# Patient Record
Sex: MALE | Race: OTHER | NOT HISPANIC OR LATINO | URBAN - METROPOLITAN AREA
[De-identification: names, ages, dates, MRNs, and addresses within clinical notes are randomized per-mention and may not be internally consistent; named-entity substitution may affect disease eponyms.]

---

## 2022-01-05 VITALS
SYSTOLIC BLOOD PRESSURE: 144 MMHG | DIASTOLIC BLOOD PRESSURE: 80 MMHG | HEART RATE: 91 BPM | TEMPERATURE: 97 F | RESPIRATION RATE: 16 BRPM | OXYGEN SATURATION: 98 %

## 2022-01-05 RX ORDER — CHLORHEXIDINE GLUCONATE 213 G/1000ML
1 SOLUTION TOPICAL ONCE
Refills: 0 | Status: DISCONTINUED | OUTPATIENT
Start: 2022-01-10 | End: 2022-01-25

## 2022-01-05 NOTE — H&P ADULT - HISTORY OF PRESENT ILLNESS
COVID:   Cardiologist: Dr. Kathy Arriaag   Pharmacy: [facility], [phone]  Escort:     67 y/o male w/ PMHx HTN, HLD, and arthritis who presented to outpatient cardiologist, Dr. Arriaga, c/o substernal, non-radiating chest pain gradually worsening in severity, experienced w/ moderate physical exertion and relieved w/ rest. Patient also endorsed dyspnea w/ light physical exertion and bilateral calf/foot heaviness and pain w/ ambulation of 3-5 city blocks or 2-3 flights of stairs. Patient denies any syncope, dizziness, palpitations, abdominal pain, nausea/vomiting, melena, LE edema, fever, chills, cough, or recent sick contacts. NST (11/08/2021) revealed small perfusion abnormality of mild intensity in the anterior and apical regions on stress images and reversible on rest images, post stress EF 56%, normal myocardial thickening, and normal wall motion.     In light of patient's risk factors, CCS Class III anginal symptoms, and abnormal NST results, patient now presents for cardiac catheterization w/ possible intervention if clinically indicated.  COVID: Negative 1/7/22-copy in chart  Cardiologist: Dr. Kathy Arriaga   Pharmacy: [facility], [phone]  Escort:     65 y/o male w/ PMHx HTN, HLD, and arthritis who presented to outpatient cardiologist, Dr. Arriaga, c/o substernal, non-radiating chest pain gradually worsening in severity, experienced w/ moderate physical exertion and relieved w/ rest. Patient also endorsed dyspnea w/ light physical exertion and bilateral calf/foot heaviness and pain w/ ambulation of 3-5 city blocks or 2-3 flights of stairs. Patient denies any syncope, dizziness, palpitations, abdominal pain, nausea/vomiting, melena, LE edema, fever, chills, cough, or recent sick contacts. NST (11/08/2021) revealed small perfusion abnormality of mild intensity in the anterior and apical regions on stress images and reversible on rest images, post stress EF 56%, normal myocardial thickening, and normal wall motion.     In light of patient's risk factors, CCS Class III anginal symptoms, and abnormal NST results, patient now presents for cardiac catheterization w/ possible intervention if clinically indicated.  COVID: Negative 1/6/22-copy in chart  Cardiologist: Dr. Kathy Arriaga   Pharmacy: [facility], [phone]  Escort:     67 y/o male w/ PMHx HTN, HLD, and arthritis who presented to outpatient cardiologist, Dr. Arriaga, c/o substernal, non-radiating chest pain gradually worsening in severity, experienced w/ moderate physical exertion and relieved w/ rest. Patient also endorsed dyspnea w/ light physical exertion and bilateral calf/foot heaviness and pain w/ ambulation of 3-5 city blocks or 2-3 flights of stairs. Patient denies any syncope, dizziness, palpitations, abdominal pain, nausea/vomiting, melena, LE edema, fever, chills, cough, or recent sick contacts. NST (11/08/2021) revealed small perfusion abnormality of mild intensity in the anterior and apical regions on stress images and reversible on rest images, post stress EF 56%, normal myocardial thickening, and normal wall motion.     In light of patient's risk factors, CCS Class III anginal symptoms, and abnormal NST results, patient now presents for cardiac catheterization w/ possible intervention if clinically indicated.  COVID: Negative 1/6/22-copy in chart  Cardiologist: Dr. Kathy Arriaga   Pharmacy: [facility], [phone]  Escort:     65 y/o male w/ PMHx HTN, HLD, and arthritis who presented to outpatient cardiologist, Dr. Arriaga, c/o substernal, non-radiating chest pain gradually worsening in severity, experienced w/ moderate physical exertion and relieved w/ rest. Patient also endorsed dyspnea w/ light physical exertion and bilateral calf/foot heaviness and pain w/ ambulation of 3-5 city blocks or 2-3 flights of stairs. Patient denies any syncope, dizziness, palpitations, abdominal pain, nausea/vomiting, melena, LE edema, fever, chills, cough, or recent sick contacts. NST (11/08/2021) revealed small perfusion abnormality of mild intensity in the anterior and apical regions on stress images and reversible on rest images, post stress EF 56%, normal myocardial thickening, and normal wall motion.     In light of patient's risk factors, CCS Class III anginal symptoms, and abnormal NST results, patient now presents for cardiac catheterization w/ possible intervention if clinically indicated.     Prior to using [facility] patient used [facility] (in system)

## 2022-01-05 NOTE — H&P ADULT - ASSESSMENT
65 y/o male w/ PMHx HTN, HLD, and arthritis who presents for cardiac cath due to patient's risk factors, CCS Angina Class III Symptoms, and abnormal NST.    ASA III               Mallampati III  Risks & benefits of procedure and alternative therapy have been explained to the patient including but not limited to: allergic reaction, bleeding w/possible need for blood transfusion, infection, renal and vascular compromise, limb damage, arrhythmia, stroke, vessel dissection/perforation, Myocardial infarction, emergent CABG. Informed consent obtained and in chart.       Labs from 1/6/2022 accepted- Hgb/HCT 13.5/39.1 (normal). Patient denies bleeding, BRBPR, melena, hematuria, hematemesis.   ASA 81 mg PO every other day-last dose 1/8/22 ASA  mg PO x 1 and Plavix 600 mg PO x 1 given prior to procedure

## 2022-01-10 ENCOUNTER — OUTPATIENT (OUTPATIENT)
Dept: OUTPATIENT SERVICES | Facility: HOSPITAL | Age: 67
LOS: 1 days | Discharge: HOME | End: 2022-01-10
Payer: COMMERCIAL

## 2022-01-10 LAB
A1C WITH ESTIMATED AVERAGE GLUCOSE RESULT: 4.3 % — SIGNIFICANT CHANGE UP (ref 4–5.6)
ALBUMIN SERPL ELPH-MCNC: 4.7 G/DL — SIGNIFICANT CHANGE UP (ref 3.3–5)
ALP SERPL-CCNC: 45 U/L — SIGNIFICANT CHANGE UP (ref 40–120)
ALT FLD-CCNC: 27 U/L — SIGNIFICANT CHANGE UP (ref 10–45)
ANION GAP SERPL CALC-SCNC: 11 MMOL/L — SIGNIFICANT CHANGE UP (ref 5–17)
APTT BLD: 31.6 SEC — SIGNIFICANT CHANGE UP (ref 27.5–35.5)
AST SERPL-CCNC: 20 U/L — SIGNIFICANT CHANGE UP (ref 10–40)
BASOPHILS # BLD AUTO: 0.03 K/UL — SIGNIFICANT CHANGE UP (ref 0–0.2)
BASOPHILS NFR BLD AUTO: 0.5 % — SIGNIFICANT CHANGE UP (ref 0–2)
BILIRUB SERPL-MCNC: 0.7 MG/DL — SIGNIFICANT CHANGE UP (ref 0.2–1.2)
BUN SERPL-MCNC: 19 MG/DL — SIGNIFICANT CHANGE UP (ref 7–23)
CALCIUM SERPL-MCNC: 10 MG/DL — SIGNIFICANT CHANGE UP (ref 8.4–10.5)
CHLORIDE SERPL-SCNC: 105 MMOL/L — SIGNIFICANT CHANGE UP (ref 96–108)
CHOLEST SERPL-MCNC: 159 MG/DL — SIGNIFICANT CHANGE UP
CK MB CFR SERPL CALC: 2.8 NG/ML — SIGNIFICANT CHANGE UP (ref 0–6.7)
CK SERPL-CCNC: 139 U/L — SIGNIFICANT CHANGE UP (ref 30–200)
CO2 SERPL-SCNC: 23 MMOL/L — SIGNIFICANT CHANGE UP (ref 22–31)
CREAT SERPL-MCNC: 1.5 MG/DL — HIGH (ref 0.5–1.3)
EOSINOPHIL # BLD AUTO: 0.09 K/UL — SIGNIFICANT CHANGE UP (ref 0–0.5)
EOSINOPHIL NFR BLD AUTO: 1.6 % — SIGNIFICANT CHANGE UP (ref 0–6)
ESTIMATED AVERAGE GLUCOSE: 77 MG/DL — SIGNIFICANT CHANGE UP (ref 68–114)
GLUCOSE SERPL-MCNC: 96 MG/DL — SIGNIFICANT CHANGE UP (ref 70–99)
HCT VFR BLD CALC: 40.6 % — SIGNIFICANT CHANGE UP (ref 39–50)
HDLC SERPL-MCNC: 49 MG/DL — SIGNIFICANT CHANGE UP
HGB BLD-MCNC: 14.7 G/DL — SIGNIFICANT CHANGE UP (ref 13–17)
IMM GRANULOCYTES NFR BLD AUTO: 0.5 % — SIGNIFICANT CHANGE UP (ref 0–1.5)
INR BLD: 1.04 — SIGNIFICANT CHANGE UP (ref 0.88–1.16)
LIPID PNL WITH DIRECT LDL SERPL: 99 MG/DL — SIGNIFICANT CHANGE UP
LYMPHOCYTES # BLD AUTO: 1.56 K/UL — SIGNIFICANT CHANGE UP (ref 1–3.3)
LYMPHOCYTES # BLD AUTO: 28.2 % — SIGNIFICANT CHANGE UP (ref 13–44)
MCHC RBC-ENTMCNC: 30.4 PG — SIGNIFICANT CHANGE UP (ref 27–34)
MCHC RBC-ENTMCNC: 36.2 GM/DL — HIGH (ref 32–36)
MCV RBC AUTO: 83.9 FL — SIGNIFICANT CHANGE UP (ref 80–100)
MONOCYTES # BLD AUTO: 0.61 K/UL — SIGNIFICANT CHANGE UP (ref 0–0.9)
MONOCYTES NFR BLD AUTO: 11 % — SIGNIFICANT CHANGE UP (ref 2–14)
NEUTROPHILS # BLD AUTO: 3.22 K/UL — SIGNIFICANT CHANGE UP (ref 1.8–7.4)
NEUTROPHILS NFR BLD AUTO: 58.2 % — SIGNIFICANT CHANGE UP (ref 43–77)
NON HDL CHOLESTEROL: 110 MG/DL — SIGNIFICANT CHANGE UP
NRBC # BLD: 0 /100 WBCS — SIGNIFICANT CHANGE UP (ref 0–0)
PLATELET # BLD AUTO: 230 K/UL — SIGNIFICANT CHANGE UP (ref 150–400)
POTASSIUM SERPL-MCNC: 4.3 MMOL/L — SIGNIFICANT CHANGE UP (ref 3.5–5.3)
POTASSIUM SERPL-SCNC: 4.3 MMOL/L — SIGNIFICANT CHANGE UP (ref 3.5–5.3)
PROT SERPL-MCNC: 7.6 G/DL — SIGNIFICANT CHANGE UP (ref 6–8.3)
PROTHROM AB SERPL-ACNC: 12.5 SEC — SIGNIFICANT CHANGE UP (ref 10.6–13.6)
RBC # BLD: 4.84 M/UL — SIGNIFICANT CHANGE UP (ref 4.2–5.8)
RBC # FLD: 16.3 % — HIGH (ref 10.3–14.5)
SODIUM SERPL-SCNC: 139 MMOL/L — SIGNIFICANT CHANGE UP (ref 135–145)
TRIGL SERPL-MCNC: 56 MG/DL — SIGNIFICANT CHANGE UP
WBC # BLD: 5.54 K/UL — SIGNIFICANT CHANGE UP (ref 3.8–10.5)
WBC # FLD AUTO: 5.54 K/UL — SIGNIFICANT CHANGE UP (ref 3.8–10.5)

## 2022-01-10 PROCEDURE — 85025 COMPLETE CBC W/AUTO DIFF WBC: CPT

## 2022-01-10 PROCEDURE — 85730 THROMBOPLASTIN TIME PARTIAL: CPT

## 2022-01-10 PROCEDURE — 82550 ASSAY OF CK (CPK): CPT

## 2022-01-10 PROCEDURE — 36415 COLL VENOUS BLD VENIPUNCTURE: CPT

## 2022-01-10 PROCEDURE — 93010 ELECTROCARDIOGRAM REPORT: CPT

## 2022-01-10 PROCEDURE — 80061 LIPID PANEL: CPT

## 2022-01-10 PROCEDURE — C1769: CPT

## 2022-01-10 PROCEDURE — 85610 PROTHROMBIN TIME: CPT

## 2022-01-10 PROCEDURE — 93458 L HRT ARTERY/VENTRICLE ANGIO: CPT

## 2022-01-10 PROCEDURE — 93458 L HRT ARTERY/VENTRICLE ANGIO: CPT | Mod: 26

## 2022-01-10 PROCEDURE — 82553 CREATINE MB FRACTION: CPT

## 2022-01-10 PROCEDURE — 93005 ELECTROCARDIOGRAM TRACING: CPT

## 2022-01-10 PROCEDURE — 83036 HEMOGLOBIN GLYCOSYLATED A1C: CPT

## 2022-01-10 PROCEDURE — C1894: CPT

## 2022-01-10 PROCEDURE — C1887: CPT

## 2022-01-10 PROCEDURE — 99152 MOD SED SAME PHYS/QHP 5/>YRS: CPT

## 2022-01-10 PROCEDURE — 80053 COMPREHEN METABOLIC PANEL: CPT

## 2022-01-10 RX ORDER — SODIUM CHLORIDE 9 MG/ML
500 INJECTION INTRAMUSCULAR; INTRAVENOUS; SUBCUTANEOUS
Refills: 0 | Status: DISCONTINUED | OUTPATIENT
Start: 2022-01-10 | End: 2022-01-25

## 2022-01-10 RX ORDER — HYDRALAZINE HCL 50 MG
1 TABLET ORAL
Qty: 0 | Refills: 0 | DISCHARGE

## 2022-01-10 RX ORDER — IBUPROFEN 200 MG
1 TABLET ORAL
Qty: 0 | Refills: 0 | DISCHARGE

## 2022-01-10 RX ORDER — CLOPIDOGREL BISULFATE 75 MG/1
600 TABLET, FILM COATED ORAL ONCE
Refills: 0 | Status: COMPLETED | OUTPATIENT
Start: 2022-01-10 | End: 2022-01-10

## 2022-01-10 RX ORDER — SODIUM CHLORIDE 9 MG/ML
500 INJECTION INTRAMUSCULAR; INTRAVENOUS; SUBCUTANEOUS
Refills: 0 | Status: DISCONTINUED | OUTPATIENT
Start: 2022-01-10 | End: 2022-01-10

## 2022-01-10 RX ORDER — SODIUM CHLORIDE 9 MG/ML
250 INJECTION INTRAMUSCULAR; INTRAVENOUS; SUBCUTANEOUS ONCE
Refills: 0 | Status: DISCONTINUED | OUTPATIENT
Start: 2022-01-10 | End: 2022-01-10

## 2022-01-10 RX ORDER — CYCLOBENZAPRINE HYDROCHLORIDE 10 MG/1
1 TABLET, FILM COATED ORAL
Qty: 0 | Refills: 0 | DISCHARGE

## 2022-01-10 RX ORDER — ATORVASTATIN CALCIUM 80 MG/1
1 TABLET, FILM COATED ORAL
Qty: 0 | Refills: 0 | DISCHARGE

## 2022-01-10 RX ORDER — ASPIRIN/CALCIUM CARB/MAGNESIUM 324 MG
1 TABLET ORAL
Qty: 0 | Refills: 0 | DISCHARGE

## 2022-01-10 RX ORDER — METOPROLOL TARTRATE 50 MG
1 TABLET ORAL
Qty: 0 | Refills: 0 | DISCHARGE

## 2022-01-10 RX ORDER — AMLODIPINE BESYLATE 2.5 MG/1
1 TABLET ORAL
Qty: 0 | Refills: 0 | DISCHARGE

## 2022-01-10 RX ORDER — ASPIRIN/CALCIUM CARB/MAGNESIUM 324 MG
325 TABLET ORAL ONCE
Refills: 0 | Status: COMPLETED | OUTPATIENT
Start: 2022-01-10 | End: 2022-01-10

## 2022-01-10 RX ORDER — LISINOPRIL 2.5 MG/1
1 TABLET ORAL
Qty: 0 | Refills: 0 | DISCHARGE

## 2022-01-10 RX ADMIN — Medication 325 MILLIGRAM(S): at 15:54

## 2022-01-10 RX ADMIN — SODIUM CHLORIDE 270 MILLILITER(S): 9 INJECTION INTRAMUSCULAR; INTRAVENOUS; SUBCUTANEOUS at 16:09

## 2022-01-10 RX ADMIN — CLOPIDOGREL BISULFATE 600 MILLIGRAM(S): 75 TABLET, FILM COATED ORAL at 15:54

## 2022-01-10 NOTE — PROGRESS NOTE ADULT - SUBJECTIVE AND OBJECTIVE BOX
Interventional Cardiology PA SDA Discharge Note    Patient without complaints. Ambulated and voided without difficulties    Afebrile, VSS    Ext: Right Radial : no hematoma, no bleeding, dressing; C/D/I    Pulses: intact RAD to baseline     A/P:  67 y/o male w/ PMHx HTN, HLD, and arthritis who presented to outpatient cardiologist, Dr. Arriaga, c/o substernal, non-radiating chest pain gradually worsening in severity, experienced w/ moderate physical exertion and relieved w/ rest. Patient also endorsed dyspnea w/ light physical exertion and bilateral calf/foot heaviness and pain w/ ambulation of 3-5 city blocks or 2-3 flights of stairs. Patient denies any syncope, dizziness, palpitations, abdominal pain, nausea/vomiting, melena, LE edema, fever, chills, cough, or recent sick contacts. NST (11/08/2021) revealed small perfusion abnormality of mild intensity in the anterior and apical regions on stress images and reversible on rest images, post stress EF 56%, normal myocardial thickening, and normal wall motion. In light of patient's risk factors, CCS Class III anginal symptoms, and abnormal NST results, patient now presents for cardiac catheterization w/ possible intervention if clinically indicated.     S/p diagnostic cardiac cath 1/10/21: nonobstructive CAD, mLAD mild myocardial bridge, slow flow RPDA, hyperdynamic EF 70%, EDP 2. R radial access.     1.	Stable for discharge as per attending Dr. Arriaga after bed rest, pt voids, groin/wrist stable and 30 minutes of ambulation.  2.	Follow-up with PMD/Cardiologist Dr. Arriaga in 1-2 weeks  3.	Discharged forms signed and copies in chart

## 2022-01-21 DIAGNOSIS — I25.118 ATHEROSCLEROTIC HEART DISEASE OF NATIVE CORONARY ARTERY WITH OTHER FORMS OF ANGINA PECTORIS: ICD-10-CM

## 2022-01-21 DIAGNOSIS — R94.39 ABNORMAL RESULT OF OTHER CARDIOVASCULAR FUNCTION STUDY: ICD-10-CM
